# Patient Record
Sex: FEMALE | Race: WHITE | NOT HISPANIC OR LATINO | Employment: FULL TIME | URBAN - METROPOLITAN AREA
[De-identification: names, ages, dates, MRNs, and addresses within clinical notes are randomized per-mention and may not be internally consistent; named-entity substitution may affect disease eponyms.]

---

## 2018-12-27 ENCOUNTER — HOSPITAL ENCOUNTER (OUTPATIENT)
Dept: RADIOLOGY | Age: 43
Discharge: HOME | End: 2018-12-27
Attending: SPECIALIST
Payer: COMMERCIAL

## 2018-12-27 ENCOUNTER — TRANSCRIBE ORDERS (OUTPATIENT)
Dept: REGISTRATION | Age: 43
End: 2018-12-27

## 2018-12-27 DIAGNOSIS — M54.50 LOW BACK PAIN: ICD-10-CM

## 2018-12-27 DIAGNOSIS — M54.50 LOW BACK PAIN: Primary | ICD-10-CM

## 2018-12-27 PROCEDURE — 72110 X-RAY EXAM L-2 SPINE 4/>VWS: CPT

## 2019-07-30 ENCOUNTER — HOSPITAL ENCOUNTER (OUTPATIENT)
Dept: RADIOLOGY | Facility: HOSPITAL | Age: 44
Discharge: HOME | End: 2019-07-30
Attending: SPECIALIST
Payer: COMMERCIAL

## 2019-07-30 ENCOUNTER — HOSPITAL ENCOUNTER (OUTPATIENT)
Dept: RADIOLOGY | Facility: HOSPITAL | Age: 44
Discharge: HOME | End: 2019-07-30
Attending: SPECIALIST | Admitting: SPECIALIST
Payer: COMMERCIAL

## 2019-07-30 VITALS
DIASTOLIC BLOOD PRESSURE: 63 MMHG | HEART RATE: 71 BPM | RESPIRATION RATE: 18 BRPM | OXYGEN SATURATION: 100 % | SYSTOLIC BLOOD PRESSURE: 110 MMHG

## 2019-07-30 DIAGNOSIS — S43.431A ANTERIOR TO POSTERIOR TEAR OF SUPERIOR GLENOID LABRUM OF RIGHT SHOULDER: ICD-10-CM

## 2019-07-30 PROCEDURE — 77002 NEEDLE LOCALIZATION BY XRAY: CPT

## 2019-07-30 PROCEDURE — BP081ZZ PLAIN RADIOGRAPHY OF RIGHT SHOULDER USING LOW OSMOLAR CONTRAST: ICD-10-PCS | Performed by: RADIOLOGY

## 2019-07-30 PROCEDURE — 27200000 HC STERILE SUPPLY

## 2019-07-30 PROCEDURE — A9585 GADOBUTROL INJECTION: HCPCS | Mod: JW | Performed by: RADIOLOGY

## 2019-07-30 PROCEDURE — 25000000 HC PHARMACY GENERAL: Performed by: NURSE PRACTITIONER

## 2019-07-30 PROCEDURE — 63600105 HC IODINE BASED CONTRAST: Performed by: RADIOLOGY

## 2019-07-30 RX ORDER — ESCITALOPRAM OXALATE 10 MG/1
TABLET ORAL
Refills: 2 | COMMUNITY
Start: 2019-06-14

## 2019-07-30 RX ORDER — LIDOCAINE HYDROCHLORIDE 10 MG/ML
INJECTION, SOLUTION INFILTRATION; PERINEURAL
Status: COMPLETED | OUTPATIENT
Start: 2019-07-30 | End: 2019-07-30

## 2019-07-30 RX ORDER — GADOBUTROL 604.72 MG/ML
0.1 INJECTION INTRAVENOUS ONCE
Status: COMPLETED | OUTPATIENT
Start: 2019-07-30 | End: 2019-07-30

## 2019-07-30 RX ORDER — CEFUROXIME AXETIL 500 MG/1
TABLET ORAL
Refills: 0 | COMMUNITY
Start: 2019-07-28

## 2019-07-30 RX ORDER — AZITHROMYCIN 250 MG/1
TABLET, FILM COATED ORAL
Refills: 0 | COMMUNITY
Start: 2019-07-28

## 2019-07-30 RX ADMIN — LIDOCAINE HYDROCHLORIDE 5 ML: 10 INJECTION, SOLUTION INFILTRATION; PERINEURAL at 13:43

## 2019-07-30 RX ADMIN — IOHEXOL 2 ML: 300 INJECTION, SOLUTION INTRAVENOUS at 14:31

## 2019-07-30 RX ADMIN — GADOBUTROL 0.1 MMOL: 604.72 INJECTION INTRAVENOUS at 14:30

## 2019-07-30 NOTE — POST-PROCEDURE NOTE
Interventional Radiology Brief Postprocedure Note    Tracy Duran     Attending: Yemi Ruiz MD    Assistant: BRIGIDO Fonseca    Diagnosis: right shoulder pain    Description of procedure: right shoulder joint MR arthrogram    Contrast: 2ml omni 300/15ml dilute Gadavist     Anesthesia:  Local    Medications: none     Complications: None      Estimated Blood Loss: Estimated Blood Loss: None    Anticoagulation: resume all medication as prescribed by primary physician    Specimens: none    Findings: Successful right shoulder joint MR arthrogram, vss, patient to MRI with RN stable following procedure.     7/30/2019 1:53 PM

## 2019-07-30 NOTE — DISCHARGE INSTRUCTIONS
Joint Injection/Aspiration     DISCHARGE INSTRUCTIONS    You may resume your normal diet.   You may resume your normal medications.   Do not drink any alcoholic beverages for the next 24 hours.   You may resume normal activity today as tolerated.   The joint may be sore for the next 24 hours. You may take Tylenol or Advil for the pain and put ice on the area.   Do not submerge in water for 24 hours. Showering is okay.   If you had a steroid injected it may take up to 4 days for full effect to take place.     Notify your primary physician and/or Interventional Radiologist IMMEDIATELY if any of the following occur:   · Fever of 101° F (38 ° C) or chills   · Swelling and or redness in the area of the puncture site   · Worsening pain   · Difficulty walking/numbness or tingling in legs/feet     Physician Contact Information   · If you have a problem that you believe requires immediate attention, you should go to the Emergency Room, either at [facility] or the closest hospital. If you believe that your problem can safely wait until you speak to a physician, you should contact your doctor or Interventional Radiologist.   · It is usually easier to contact your own physician by calling the office, or after hours through the answering service. If you do not know the number, the hospital  can connect you by calling [phone].   · If you have concerns that need to be answered by the Interventional Radiologist, you can reach him/ her as follows:   o During regular weekday hours (8AM to 5PM), call [phone] and ask the technologist to connect you with the Interventional Radiologist.   o During off hours for emergencies call [phone] and ask the hospital  to contact the Interventional Radiologist on-call.

## 2019-07-30 NOTE — Clinical Note
Patient placed on procedure table in supine position with arms at side. Positioning devices: all pressure points padded and safety strap applied.

## 2019-07-30 NOTE — H&P
Inpatient History & Physical     Admitting Diagnosis: Anterior to posterior tear of superior glenoid labrum of right shoulder [S43.431A]    HPI  Patient is a 44 y.o. female who presents with right shoulder pain.     Medical History: No past medical history on file.    Surgical History: No past surgical history on file.    Allergies: Patient has no known allergies.    [unfilled]    Social History:   Social History     Social History   • Marital status: Single     Spouse name: N/A   • Number of children: N/A   • Years of education: N/A     Social History Main Topics   • Smoking status: Not on file   • Smokeless tobacco: Not on file   • Alcohol use Not on file   • Drug use: Unknown   • Sexual activity: Not on file     Other Topics Concern   • Not on file     Social History Narrative   • No narrative on file       Family History: No family history on file.    Review of Systems  All other systems reviewed and negative except as noted in the HPI.    Objective     Vital Signs for the last 24 hours:  Heart Rate:  [71-87] 71  Resp:  [18] 18  BP: (110-112)/(63-67) 110/63      General appearance: alert, appears stated age, no distress and cooperative    Labs   No new labs.    Imaging  Not applicable    ECG/Telemetry  not applicable    Assessment/Plan     Continue with right shoulder joint MR arthrogram    VTE Assessment: Padua      Code Status: No Order  Estimated discharge date: 7/30/2019      BRIGIDO Fonseca

## 2020-01-16 ENCOUNTER — HOSPITAL ENCOUNTER (OUTPATIENT)
Dept: RADIOLOGY | Facility: HOSPITAL | Age: 45
Discharge: HOME | End: 2020-01-16
Attending: ORTHOPAEDIC SURGERY | Admitting: RADIOLOGY
Payer: COMMERCIAL

## 2020-01-16 ENCOUNTER — HOSPITAL ENCOUNTER (OUTPATIENT)
Dept: RADIOLOGY | Facility: HOSPITAL | Age: 45
Discharge: HOME | End: 2020-01-16
Attending: ORTHOPAEDIC SURGERY
Payer: COMMERCIAL

## 2020-01-16 VITALS
HEART RATE: 73 BPM | OXYGEN SATURATION: 100 % | SYSTOLIC BLOOD PRESSURE: 106 MMHG | RESPIRATION RATE: 18 BRPM | DIASTOLIC BLOOD PRESSURE: 65 MMHG

## 2020-01-16 DIAGNOSIS — S43.431A ANTERIOR TO POSTERIOR TEAR OF SUPERIOR GLENOID LABRUM OF RIGHT SHOULDER: ICD-10-CM

## 2020-01-16 PROCEDURE — 77002 NEEDLE LOCALIZATION BY XRAY: CPT

## 2020-01-16 PROCEDURE — 63600105 HC IODINE BASED CONTRAST: Mod: JW | Performed by: RADIOLOGY

## 2020-01-16 PROCEDURE — 3E0U3BZ INTRODUCTION OF ANESTHETIC AGENT INTO JOINTS, PERCUTANEOUS APPROACH: ICD-10-PCS | Performed by: RADIOLOGY

## 2020-01-16 PROCEDURE — 27200000 HC STERILE SUPPLY

## 2020-01-16 PROCEDURE — 63600000 HC DRUGS/DETAIL CODE: Performed by: RADIOLOGY

## 2020-01-16 PROCEDURE — 25000000 HC PHARMACY GENERAL: Performed by: RADIOLOGY

## 2020-01-16 PROCEDURE — 3E0U33Z INTRODUCTION OF ANTI-INFLAMMATORY INTO JOINTS, PERCUTANEOUS APPROACH: ICD-10-PCS | Performed by: RADIOLOGY

## 2020-01-16 RX ORDER — LIDOCAINE HYDROCHLORIDE 10 MG/ML
INJECTION, SOLUTION INFILTRATION; PERINEURAL
Status: COMPLETED | OUTPATIENT
Start: 2020-01-16 | End: 2020-01-16

## 2020-01-16 RX ORDER — TRIAMCINOLONE ACETONIDE 40 MG/ML
INJECTION, SUSPENSION INTRA-ARTICULAR; INTRAMUSCULAR
Status: COMPLETED | OUTPATIENT
Start: 2020-01-16 | End: 2020-01-16

## 2020-01-16 RX ORDER — DEXLANSOPRAZOLE 60 MG/1
60 CAPSULE, DELAYED RELEASE ORAL DAILY
COMMUNITY
Start: 2019-12-05

## 2020-01-16 RX ADMIN — LIDOCAINE HYDROCHLORIDE 4 ML: 10 INJECTION, SOLUTION INFILTRATION; PERINEURAL at 09:15

## 2020-01-16 RX ADMIN — LIDOCAINE HYDROCHLORIDE 5 ML: 10 INJECTION, SOLUTION INFILTRATION; PERINEURAL at 09:00

## 2020-01-16 RX ADMIN — LIDOCAINE HYDROCHLORIDE 5 ML: 10 INJECTION, SOLUTION INFILTRATION; PERINEURAL at 09:14

## 2020-01-16 RX ADMIN — IOHEXOL 1 ML: 300 INJECTION, SOLUTION INTRAVENOUS at 09:33

## 2020-01-16 RX ADMIN — TRIAMCINOLONE ACETONIDE 40 MG: 40 INJECTION, SUSPENSION INTRA-ARTICULAR; INTRAMUSCULAR at 09:01

## 2020-01-16 RX ADMIN — LIDOCAINE HYDROCHLORIDE 4 ML: 10 INJECTION, SOLUTION INFILTRATION; PERINEURAL at 09:00

## 2020-01-16 RX ADMIN — TRIAMCINOLONE ACETONIDE 40 MG: 40 INJECTION, SUSPENSION INTRA-ARTICULAR; INTRAMUSCULAR at 09:16

## 2020-01-16 NOTE — DISCHARGE INSTRUCTIONS
DISCHARGE INSTRUCTIONS  You have had steroid injections of your right glenohumeral and scapulothoracic joints.   You may resume your normal diet.  You may resume your normal medications.   You may resume normal activity today as tolerated.  The joint may be sore for the next 24 hours. You may take Tylenol or Advil for the pain and put ice on the area.        Notify your primary physician and/or Interventional Radiologist IMMEDIATELY if any of the following occur:  · Fever of 101° F (38 ° C) or chills  · Swelling and or redness in the area of the puncture site  · Worsening pain  · Numbness or tingling in the extremity or adjacent to the injected joint    Physician Contact Information  • If you have a problem that you believe requires immediate attention, you should go to the Emergency Room, either at [facility] or the closest hospital. If you believe that your problem can safely wait until you speak to a physician, you should contact your doctor or Interventional Radiologist.   • It is usually easier to contact your own physician by calling the office, or after hours through the answering service. If you do not know the number, the hospital  can connect you by calling [phone].   • If you have concerns that need to be answered by the Interventional Radiologist, you can reach him/ her as follows:   o During regular weekday hours (8AM to 5PM), call [phone] and ask the technologist to connect you with the Interventional Radiologist.   o During off hours for emergencies call [phone] and ask the hospital  to contact the Interventional Radiologist on-call.

## 2020-01-16 NOTE — POST-PROCEDURE NOTE
Interventional Radiology Brief Postprocedure Note    Tracy Duran     Attending: Sara    Assistant:      Diagnosis: chronic right shoulder pain, labral tear, posterior right shoulder pain    Description of procedure: 1. Fluoro-guided right glenohumeral joint injection  2. U/s guided right scapulothoracic joint injection    Contrast: 0.5 cc, Omni 300     Anesthesia:  Local    Medications: Kenalog - 40 mg and 4 cc 1% Lidocaine - each joint     Complications: None      Estimated Blood Loss: Estimated Blood Loss: None    Anticoagulation:      Specimens:      Findings: 1. Successful fluoro-guided right glenohumeral joint injection  2. Successful u/s guided right scapulothoracic joint injection    1/16/2020 9:21 AM
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
## 2021-04-14 DIAGNOSIS — Z23 ENCOUNTER FOR IMMUNIZATION: ICD-10-CM
